# Patient Record
Sex: MALE | Race: OTHER | HISPANIC OR LATINO
[De-identification: names, ages, dates, MRNs, and addresses within clinical notes are randomized per-mention and may not be internally consistent; named-entity substitution may affect disease eponyms.]

---

## 2023-06-09 ENCOUNTER — APPOINTMENT (OUTPATIENT)
Dept: INTERNAL MEDICINE | Facility: CLINIC | Age: 33
End: 2023-06-09

## 2023-10-30 ENCOUNTER — APPOINTMENT (OUTPATIENT)
Dept: INTERNAL MEDICINE | Facility: CLINIC | Age: 33
End: 2023-10-30
Payer: MEDICAID

## 2023-10-30 ENCOUNTER — OUTPATIENT (OUTPATIENT)
Dept: OUTPATIENT SERVICES | Facility: HOSPITAL | Age: 33
LOS: 1 days | End: 2023-10-30

## 2023-10-30 VITALS
HEIGHT: 72 IN | SYSTOLIC BLOOD PRESSURE: 100 MMHG | DIASTOLIC BLOOD PRESSURE: 68 MMHG | BODY MASS INDEX: 32.23 KG/M2 | OXYGEN SATURATION: 98 % | HEART RATE: 87 BPM | WEIGHT: 238 LBS

## 2023-10-30 DIAGNOSIS — M25.512 PAIN IN LEFT SHOULDER: ICD-10-CM

## 2023-10-30 DIAGNOSIS — K21.9 GASTRO-ESOPHAGEAL REFLUX DISEASE WITHOUT ESOPHAGITIS: ICD-10-CM

## 2023-10-30 DIAGNOSIS — Z80.3 FAMILY HISTORY OF MALIGNANT NEOPLASM OF BREAST: ICD-10-CM

## 2023-10-30 DIAGNOSIS — J45.20 MILD INTERMITTENT ASTHMA, UNCOMPLICATED: ICD-10-CM

## 2023-10-30 DIAGNOSIS — Z00.00 ENCOUNTER FOR GENERAL ADULT MEDICAL EXAMINATION WITHOUT ABNORMAL FINDINGS: ICD-10-CM

## 2023-10-30 DIAGNOSIS — Z80.9 FAMILY HISTORY OF MALIGNANT NEOPLASM, UNSPECIFIED: ICD-10-CM

## 2023-10-30 DIAGNOSIS — Z82.49 FAMILY HISTORY OF ISCHEMIC HEART DISEASE AND OTHER DISEASES OF THE CIRCULATORY SYSTEM: ICD-10-CM

## 2023-10-30 PROCEDURE — 99385 PREV VISIT NEW AGE 18-39: CPT

## 2023-10-30 RX ORDER — ALBUTEROL SULFATE 90 UG/1
108 (90 BASE) INHALANT RESPIRATORY (INHALATION)
Qty: 1 | Refills: 3 | Status: ACTIVE | COMMUNITY
Start: 2023-10-30 | End: 1900-01-01

## 2023-10-31 LAB
25(OH)D3 SERPL-MCNC: 18.4 NG/ML
ALBUMIN SERPL ELPH-MCNC: 4.8 G/DL
ALP BLD-CCNC: 62 U/L
ALT SERPL-CCNC: 63 U/L
ANION GAP SERPL CALC-SCNC: 10 MMOL/L
AST SERPL-CCNC: 33 U/L
BASOPHILS # BLD AUTO: 0.06 K/UL
BASOPHILS NFR BLD AUTO: 0.9 %
BILIRUB SERPL-MCNC: 0.6 MG/DL
BUN SERPL-MCNC: 16 MG/DL
C TRACH RRNA SPEC QL NAA+PROBE: NOT DETECTED
CALCIUM SERPL-MCNC: 10.2 MG/DL
CHLORIDE SERPL-SCNC: 103 MMOL/L
CHOLEST SERPL-MCNC: 269 MG/DL
CO2 SERPL-SCNC: 27 MMOL/L
CREAT SERPL-MCNC: 0.93 MG/DL
EGFR: 110 ML/MIN/1.73M2
EOSINOPHIL # BLD AUTO: 0.28 K/UL
EOSINOPHIL NFR BLD AUTO: 4 %
ESTIMATED AVERAGE GLUCOSE: 111 MG/DL
GLUCOSE SERPL-MCNC: 93 MG/DL
HBA1C MFR BLD HPLC: 5.5 %
HBV CORE IGG+IGM SER QL: NONREACTIVE
HBV SURFACE AB SER QL: NONREACTIVE
HBV SURFACE AG SER QL: NONREACTIVE
HCT VFR BLD CALC: 48.8 %
HCV AB SER QL: NONREACTIVE
HCV S/CO RATIO: 0.07 S/CO
HDLC SERPL-MCNC: 42 MG/DL
HGB BLD-MCNC: 16.4 G/DL
HIV1+2 AB SPEC QL IA.RAPID: NONREACTIVE
IMM GRANULOCYTES NFR BLD AUTO: 0.1 %
LDLC SERPL CALC-MCNC: 200 MG/DL
LDLC SERPL DIRECT ASSAY-MCNC: 191 MG/DL
LYMPHOCYTES # BLD AUTO: 2.77 K/UL
LYMPHOCYTES NFR BLD AUTO: 39.6 %
MAN DIFF?: NORMAL
MCHC RBC-ENTMCNC: 30.1 PG
MCHC RBC-ENTMCNC: 33.6 GM/DL
MCV RBC AUTO: 89.5 FL
MONOCYTES # BLD AUTO: 0.47 K/UL
MONOCYTES NFR BLD AUTO: 6.7 %
N GONORRHOEA RRNA SPEC QL NAA+PROBE: NOT DETECTED
NEUTROPHILS # BLD AUTO: 3.41 K/UL
NEUTROPHILS NFR BLD AUTO: 48.7 %
NONHDLC SERPL-MCNC: 226 MG/DL
PLATELET # BLD AUTO: 300 K/UL
POTASSIUM SERPL-SCNC: 4.6 MMOL/L
PROT SERPL-MCNC: 7.8 G/DL
RBC # BLD: 5.45 M/UL
RBC # FLD: 12.9 %
SODIUM SERPL-SCNC: 140 MMOL/L
SOURCE AMPLIFICATION: NORMAL
T PALLIDUM AB SER QL IA: NEGATIVE
TRIGL SERPL-MCNC: 143 MG/DL
WBC # FLD AUTO: 7 K/UL

## 2023-11-03 ENCOUNTER — APPOINTMENT (OUTPATIENT)
Dept: INTERNAL MEDICINE | Facility: CLINIC | Age: 33
End: 2023-11-03
Payer: MEDICAID

## 2023-11-03 ENCOUNTER — OUTPATIENT (OUTPATIENT)
Dept: OUTPATIENT SERVICES | Facility: HOSPITAL | Age: 33
LOS: 1 days | End: 2023-11-03

## 2023-11-03 VITALS — WEIGHT: 238 LBS | HEIGHT: 72 IN | BODY MASS INDEX: 32.23 KG/M2

## 2023-11-03 DIAGNOSIS — Z00.00 ENCOUNTER FOR GENERAL ADULT MEDICAL EXAMINATION W/OUT ABNORMAL FINDINGS: ICD-10-CM

## 2023-11-03 DIAGNOSIS — E55.9 VITAMIN D DEFICIENCY, UNSPECIFIED: ICD-10-CM

## 2023-11-03 PROCEDURE — ZZZZZ: CPT

## 2023-11-06 DIAGNOSIS — E55.9 VITAMIN D DEFICIENCY, UNSPECIFIED: ICD-10-CM

## 2023-11-06 DIAGNOSIS — E78.5 HYPERLIPIDEMIA, UNSPECIFIED: ICD-10-CM

## 2023-11-09 ENCOUNTER — LABORATORY RESULT (OUTPATIENT)
Age: 33
End: 2023-11-09

## 2023-11-09 ENCOUNTER — APPOINTMENT (OUTPATIENT)
Dept: CARDIOLOGY | Facility: CLINIC | Age: 33
End: 2023-11-09
Payer: MEDICAID

## 2023-11-09 VITALS
HEIGHT: 72 IN | RESPIRATION RATE: 16 BRPM | BODY MASS INDEX: 31.29 KG/M2 | OXYGEN SATURATION: 98 % | HEART RATE: 96 BPM | TEMPERATURE: 98 F | WEIGHT: 231 LBS

## 2023-11-09 VITALS
WEIGHT: 231 LBS | HEART RATE: 96 BPM | SYSTOLIC BLOOD PRESSURE: 124 MMHG | BODY MASS INDEX: 31.29 KG/M2 | TEMPERATURE: 97.6 F | RESPIRATION RATE: 15 BRPM | DIASTOLIC BLOOD PRESSURE: 78 MMHG | HEIGHT: 72 IN | OXYGEN SATURATION: 99 %

## 2023-11-09 DIAGNOSIS — Z78.9 OTHER SPECIFIED HEALTH STATUS: ICD-10-CM

## 2023-11-09 PROCEDURE — 99203 OFFICE O/P NEW LOW 30 MIN: CPT

## 2024-01-10 ENCOUNTER — APPOINTMENT (OUTPATIENT)
Dept: INTERNAL MEDICINE | Facility: CLINIC | Age: 34
End: 2024-01-10

## 2024-01-23 ENCOUNTER — APPOINTMENT (OUTPATIENT)
Dept: CARDIOLOGY | Facility: CLINIC | Age: 34
End: 2024-01-23

## 2024-03-11 ENCOUNTER — APPOINTMENT (OUTPATIENT)
Dept: CARDIOLOGY | Facility: CLINIC | Age: 34
End: 2024-03-11

## 2024-03-25 ENCOUNTER — TRANSCRIPTION ENCOUNTER (OUTPATIENT)
Age: 34
End: 2024-03-25

## 2024-03-25 ENCOUNTER — APPOINTMENT (OUTPATIENT)
Dept: INTERNAL MEDICINE | Facility: CLINIC | Age: 34
End: 2024-03-25
Payer: MEDICAID

## 2024-03-25 ENCOUNTER — OUTPATIENT (OUTPATIENT)
Dept: OUTPATIENT SERVICES | Facility: HOSPITAL | Age: 34
LOS: 1 days | End: 2024-03-25

## 2024-03-25 VITALS
WEIGHT: 231 LBS | DIASTOLIC BLOOD PRESSURE: 84 MMHG | HEART RATE: 75 BPM | SYSTOLIC BLOOD PRESSURE: 124 MMHG | BODY MASS INDEX: 31.29 KG/M2 | OXYGEN SATURATION: 99 % | HEIGHT: 72 IN

## 2024-03-25 DIAGNOSIS — E78.5 HYPERLIPIDEMIA, UNSPECIFIED: ICD-10-CM

## 2024-03-25 DIAGNOSIS — K21.9 GASTRO-ESOPHAGEAL REFLUX DISEASE W/OUT ESOPHAGITIS: ICD-10-CM

## 2024-03-25 DIAGNOSIS — R74.01 ELEVATION OF LEVELS OF LIVER TRANSAMINASE LEVELS: ICD-10-CM

## 2024-03-25 DIAGNOSIS — Z23 ENCOUNTER FOR IMMUNIZATION: ICD-10-CM

## 2024-03-25 DIAGNOSIS — K21.9 GASTRO-ESOPHAGEAL REFLUX DISEASE WITHOUT ESOPHAGITIS: ICD-10-CM

## 2024-03-25 DIAGNOSIS — J45.20 MILD INTERMITTENT ASTHMA, UNCOMPLICATED: ICD-10-CM

## 2024-03-25 LAB
25(OH)D3 SERPL-MCNC: 22.7 NG/ML
ALBUMIN SERPL ELPH-MCNC: 4.7 G/DL
ALP BLD-CCNC: 64 U/L
ALT SERPL-CCNC: 63 U/L
ANION GAP SERPL CALC-SCNC: 12 MMOL/L
AST SERPL-CCNC: 36 U/L
BILIRUB SERPL-MCNC: 0.6 MG/DL
BUN SERPL-MCNC: 11 MG/DL
CALCIUM SERPL-MCNC: 9.8 MG/DL
CHLORIDE SERPL-SCNC: 100 MMOL/L
CHOLEST SERPL-MCNC: 208 MG/DL
CO2 SERPL-SCNC: 24 MMOL/L
CREAT SERPL-MCNC: 0.81 MG/DL
EGFR: 119 ML/MIN/1.73M2
GLUCOSE SERPL-MCNC: 97 MG/DL
HDLC SERPL-MCNC: 43 MG/DL
LDLC SERPL CALC-MCNC: 151 MG/DL
LDLC SERPL DIRECT ASSAY-MCNC: 145 MG/DL
NONHDLC SERPL-MCNC: 165 MG/DL
POTASSIUM SERPL-SCNC: 4.7 MMOL/L
PROT SERPL-MCNC: 7.2 G/DL
SODIUM SERPL-SCNC: 137 MMOL/L
TRIGL SERPL-MCNC: 83 MG/DL

## 2024-03-25 PROCEDURE — 99214 OFFICE O/P EST MOD 30 MIN: CPT | Mod: 25

## 2024-03-25 NOTE — PHYSICAL EXAM
[No Acute Distress] : no acute distress [Normal Sclera/Conjunctiva] : normal sclera/conjunctiva [Normal Outer Ear/Nose] : the outer ears and nose were normal in appearance [Supple] : supple [No Respiratory Distress] : no respiratory distress  [No Accessory Muscle Use] : no accessory muscle use [Clear to Auscultation] : lungs were clear to auscultation bilaterally [Normal Rate] : normal rate  [Regular Rhythm] : with a regular rhythm [Normal S1, S2] : normal S1 and S2 [No Edema] : there was no peripheral edema [Soft] : abdomen soft [Non Tender] : non-tender [Non-distended] : non-distended [Normal Bowel Sounds] : normal bowel sounds [No Spinal Tenderness] : no spinal tenderness [Grossly Normal Strength/Tone] : grossly normal strength/tone [No Focal Deficits] : no focal deficits [Normal Affect] : the affect was normal [Normal Insight/Judgement] : insight and judgment were intact

## 2024-03-25 NOTE — PLAN
[FreeTextEntry1] : Patient is a 33 yr old M with hx of acid reflux and asthma coming in for a follow up  #HLD -following lipid specialist/cardiology -cont healthy eating/ lifestyle changes - on rosuvastatin 20mg - will obtain lipid panel/CMP  #transaminitis -will repeat CMP   #low vit D - will check vit D - Vit D supplement sent last visit   #reflux/bothersome sensation in esophagus - resent famotidine 20mg PRN as it was helpful - again discussed reflux diet  -GI referral provided   #asthma -well controlled -albuterol PRN ordered  #HCM -declines Hep B vaccination/flu shot and other immunization -open to tdap-- ordered  f/u in 3 mo or PRN

## 2024-03-25 NOTE — REVIEW OF SYSTEMS
[Abdominal Pain] : no abdominal pain [Nausea] : no nausea [Vomiting] : no vomiting [Heartburn] : heartburn [Negative] : Heme/Lymph [FreeTextEntry9] : shoulder pain

## 2024-03-25 NOTE — HISTORY OF PRESENT ILLNESS
[FreeTextEntry1] : f/u [de-identified] : Patient is a 33 yr old M with hx of acid reflux and asthma coming for a follow up.  Pt continues w reflux which happens every day particularly at night. Was taking famotidine which was helping but he ran out of medication.  Endorses that solids cause a bothersome sensation in his throat. No longer having pain. No chocking.  Denies N/V/C/D/ wt loss/ fever/ chills. Has been trying to follow reflux diet.   Endorses compliance with statin as per his cardiologist.   Endorses asthma is very well controlled. Has only used albuterol once.

## 2024-04-09 ENCOUNTER — TRANSCRIPTION ENCOUNTER (OUTPATIENT)
Age: 34
End: 2024-04-09

## 2024-04-09 RX ORDER — FAMOTIDINE 20 MG/1
20 TABLET, FILM COATED ORAL TWICE DAILY
Qty: 180 | Refills: 0 | Status: ACTIVE | COMMUNITY
Start: 2023-10-30 | End: 1900-01-01

## 2024-04-29 ENCOUNTER — APPOINTMENT (OUTPATIENT)
Dept: INTERNAL MEDICINE | Facility: CLINIC | Age: 34
End: 2024-04-29

## 2024-04-29 ENCOUNTER — OUTPATIENT (OUTPATIENT)
Dept: OUTPATIENT SERVICES | Facility: HOSPITAL | Age: 34
LOS: 1 days | End: 2024-04-29

## 2024-04-30 DIAGNOSIS — R79.89 OTHER SPECIFIED ABNORMAL FINDINGS OF BLOOD CHEMISTRY: ICD-10-CM

## 2024-04-30 LAB
FERRITIN SERPL-MCNC: 548 NG/ML
IRON SATN MFR SERPL: 39 %
IRON SERPL-MCNC: 138 UG/DL
TIBC SERPL-MCNC: 349 UG/DL
UIBC SERPL-MCNC: 211 UG/DL

## 2024-05-01 DIAGNOSIS — R74.01 ELEVATION OF LEVELS OF LIVER TRANSAMINASE LEVELS: ICD-10-CM

## 2024-05-08 ENCOUNTER — APPOINTMENT (OUTPATIENT)
Dept: CARDIOLOGY | Facility: CLINIC | Age: 34
End: 2024-05-08
Payer: MEDICAID

## 2024-05-08 VITALS
SYSTOLIC BLOOD PRESSURE: 122 MMHG | RESPIRATION RATE: 16 BRPM | TEMPERATURE: 98 F | HEIGHT: 72 IN | OXYGEN SATURATION: 98 % | HEART RATE: 95 BPM | DIASTOLIC BLOOD PRESSURE: 80 MMHG | BODY MASS INDEX: 29.66 KG/M2 | WEIGHT: 219 LBS

## 2024-05-08 DIAGNOSIS — E78.5 HYPERLIPIDEMIA, UNSPECIFIED: ICD-10-CM

## 2024-05-08 DIAGNOSIS — E78.01 FAMILIAL HYPERCHOLESTEROLEMIA: ICD-10-CM

## 2024-05-08 PROCEDURE — G2211 COMPLEX E/M VISIT ADD ON: CPT | Mod: NC,1L

## 2024-05-08 PROCEDURE — 99214 OFFICE O/P EST MOD 30 MIN: CPT

## 2024-05-08 RX ORDER — EZETIMIBE 10 MG/1
10 TABLET ORAL DAILY
Qty: 90 | Refills: 1 | Status: ACTIVE | COMMUNITY
Start: 2024-05-08 | End: 1900-01-01

## 2024-05-08 RX ORDER — ROSUVASTATIN CALCIUM 40 MG/1
40 TABLET, FILM COATED ORAL
Qty: 90 | Refills: 1 | Status: ACTIVE | COMMUNITY
Start: 2023-11-03 | End: 1900-01-01

## 2024-05-08 NOTE — DISCUSSION/SUMMARY
[FreeTextEntry1] : Dr. Bledsoe-(PRIOR VISIT and PMH WITH Dr. Bledsoe): This is a 33-year-old male with past medical history significant for hypercholesterolemia, who comes in for lipid/cardiac consultation. He denies chest pain, shortness of breath, dizziness or syncope.  He reports that he has had high cholesterol for many years. He has no history of rheumatic fever.  He does not drink excessive caffeine or alcohol. Cardiac risk factors include hypercholesterolemia and family history of atherosclerosis (mother, and brother have hypercholesterolemia, and both grandfathers had myocardial infarctions in their late 50s). The patient will have new blood work done today for lipoprotein a, lipoprotein B, high-sensitivity C-reactive protein and direct LDL cholesterol. Lipid panel done October 30, 2023 demonstrated cholesterol 269, HDL of 42, LDL calculated 200, non-HDL cholesterol 226 mg/dL, LDL direct 191 mg/dL, and triglycerides 143 mg/dL.  Hemoglobin A1c 5.5. These findings are consistent with heterozygous familial hypercholesterolemia. The patient will have genetic testing for LDL mutation done at the time of next visit. I recommended he work with our registered dietitian to improve his diet.  He was started on rosuvastatin 40 mg daily which would provide a 50% reduction in his LDL profile.  I would prefer to restart it rosuvastatin 20 mg daily which would also provide a 50% reduction in his LDL profile.  This will provide adequate opportunity to add a second medication if necessary while maintaining a lower dose of statin therapy. He will have new blood work in 6 weeks and further recommendations will be made at that time.  The patient is instructed to follow-up with his primary care physician Dr. Chau.  The patient understands that aerobic exercises must be increased to 40 minutes 4 times per week. A detailed discussion of lifestyle modification was done today. The patient has a good understanding of the diagnosis, and treatment plan. Lifestyle modification was also outlined.  Thank you for allowing to participate in the care of your patient.  Please do not hesitate to call if you have any questions.

## 2024-05-08 NOTE — PHYSICAL EXAM

## 2024-05-08 NOTE — ASSESSMENT
[FreeTextEntry1] : This is a 33-year-old male with past medical history significant for hypercholesterolemia, who comes in for lipid/cardiac follow-up evaluation.  He has no history of rheumatic fever.  He does not drink excessive caffeine or alcohol.  Cardiac risk factors include hypercholesterolemia and family history of atherosclerosis (mother, and brother have hypercholesterolemia, and both grandfathers had myocardial infarctions in their late 50s).  HPI: He is feeling generally well today and denies chest pain, dizziness, heart palpitations, recent episodes of syncope or falls, SOB, or dyspnea at this time.  Current Medications: Rosuvastatin 20 mg daily.   His lipid panel done March 2024 demonstrated triglyceride 83, cholesterol 208, HDL 43, , non-, LDL direct 145. He reports compliance with his Rosuvastatin 20 mg daily in between his November 2023-March 2024 blood work. I have recommended he increase to Rosuvastatin 40 mg daily and begin Zetia 10 mg daily for better cholesterol management.   Genetic testing completed today for LDL receptor mutation.    BLOOD PRESSURE: Controlled.   BLOOD WORK: -New blood work prescription was given to patient on 05/08/2024 to evaluate lipid profile, CBC, BMP, hepatic function, A1C and TSH in 4-6 weeks.  -Lipid panel done March 2024 demonstrated triglyceride 83, cholesterol 208, HDL 43, , non-, LDL direct 145 -Lipid panel done November 2023 demonstrated triglycerides 106, cholesterol 215, HDL 37, , non-, LDL direct 159. -Lipid panel done October 30, 2023 demonstrated cholesterol 269, HDL of 42, LDL calculated 200, non-HDL cholesterol 226 mg/dL, LDL direct 191 mg/dL, and triglycerides 143 mg/dL, Hemoglobin A1c 5.5%.     PLAN: -He will begin Zetia 10 mg daily and increase to Rosuvastatin 40 mg daily.  -New blood work prescription was given to patient on 05/08/2024 to evaluate lipid profile, CBC, BMP, hepatic function, A1C and TSH in 4-6 weeks.  -Genetic testing done today for LDL receptor mutation.  -He will continue his current medications and contact the office if problems arise before next follow-up appointment. -He will follow-up with his PCP routinely.   I have discussed the plan of care with CARMEN ANDERSON and he will follow up in 3 months. They are compliant with all of their medications.     The patient understands that aerobic exercises must be increased to 40 minutes 4 times/week and a detailed discussion of lifestyle modification was done today.   The patient has a good understanding of the diagnosis, treatment plan and lifestyle modification.   They will contact me at the office for any questions with their care or any changes in their health status.   The patient was discussed with supervision physician Dr. Isaak Bledsoe at the time of the visit and the plan of care will be carried out as noted above.     LARISA Solano NP

## 2024-05-08 NOTE — REASON FOR VISIT
[CV Risk Factors and Non-Cardiac Disease] : CV risk factors and non-cardiac disease [FreeTextEntry3] : Dr. Chau [FreeTextEntry1] : 33-year-old male with a PMHx of hyperlipidemia presents for cardiac evaluation. Patient doesn't smoke and only drinks alcohol socially. Patient denies chest pain, shortness of breath fever, chills.  Mother has a history of hyperlipidemia. Both of his grandfathers suffered from MI's around 57/60 years old. His father has a history of cancer.  Bloodwork from 10/31/23 demonstrated elevated triglycerides (143), elevated cholesterol (269), elevated LDL. (200). New blood was drawn today 11/9/23.  Last echo and stress test were around 6 years ago.

## 2024-05-21 ENCOUNTER — RX RENEWAL (OUTPATIENT)
Age: 34
End: 2024-05-21

## 2024-07-01 ENCOUNTER — LABORATORY RESULT (OUTPATIENT)
Age: 34
End: 2024-07-01

## 2024-09-30 ENCOUNTER — APPOINTMENT (OUTPATIENT)
Dept: CARDIOLOGY | Facility: CLINIC | Age: 34
End: 2024-09-30

## 2024-09-30 DIAGNOSIS — E78.5 HYPERLIPIDEMIA, UNSPECIFIED: ICD-10-CM

## 2024-09-30 DIAGNOSIS — E78.01 FAMILIAL HYPERCHOLESTEROLEMIA: ICD-10-CM

## 2024-09-30 NOTE — ASSESSMENT
[FreeTextEntry1] : This is a 33-year-old male with past medical history significant for hypercholesterolemia and elevated Lipoprotein A who comes in for lipid/cardiac follow-up evaluation.  He has no history of rheumatic fever.  He does not drink excessive caffeine or alcohol.  Cardiac risk factors include hypercholesterolemia and family history of atherosclerosis (mother, and brother have hypercholesterolemia, and both grandfathers had myocardial infarctions in their late 50s).  HPI: He is feeling generally well today and denies chest pain, dizziness, heart palpitations, recent episodes of syncope or falls, SOB, or dyspnea at this time.  Current Medications: Rosuvastatin 40 mg daily and Zetia 10 mg daily.   His lipid panel done March 2024 demonstrated triglyceride 83, cholesterol 208, HDL 43, , non-, LDL direct 145. He reports compliance with his Rosuvastatin 20 mg daily in between his November 2023-March 2024 blood work. I have recommended he increase to Rosuvastatin 40 mg daily and begin Zetia 10 mg daily for better cholesterol management.     BLOOD PRESSURE: Controlled.    BLOOD WORK:  *LDL target goal < 100*  -Lipid panel done July 2024 demonstrated triglyceride 49, cholesterol 111, HDL 40, LDL 60, non-HDL 71, LDL direct 59.  -[facility] results done May 2024 negative for variant, or mutation associated with heterozygous familial hypercholesterolemia.  -Lipid panel done March 2024 demonstrated triglyceride 83, cholesterol 208, HDL 43, , non-, LDL direct 145 -Lipid panel done November 2023 demonstrated triglycerides 106, cholesterol 215, HDL 37, , non-, LDL direct 159. -Lipid panel done October 30, 2023 demonstrated cholesterol 269, HDL of 42, LDL calculated 200, non-HDL cholesterol 226 mg/dL, LDL direct 191 mg/dL, and triglycerides 143 mg/dL, Hemoglobin A1c 5.5%.     PLAN: -He will continue his current medications and contact the office if problems arise before next follow-up appointment. -He will follow-up with his PCP routinely.   I have discussed the plan of care with CARMEN ANDERSON and he will follow up in 3 months. They are compliant with all of their medications.     The patient understands that aerobic exercises must be increased to 40 minutes 4 times/week and a detailed discussion of lifestyle modification was done today.   The patient has a good understanding of the diagnosis, treatment plan and lifestyle modification.   They will contact me at the office for any questions with their care or any changes in their health status.   The patient was discussed with supervision physician Dr. Isaak Bledsoe at the time of the visit and the plan of care will be carried out as noted above.     LARISA Solano NP

## 2024-12-02 ENCOUNTER — RESULT REVIEW (OUTPATIENT)
Age: 34
End: 2024-12-02

## 2025-02-18 ENCOUNTER — RESULT REVIEW (OUTPATIENT)
Age: 35
End: 2025-02-18